# Patient Record
Sex: FEMALE | Race: WHITE | Employment: UNEMPLOYED | URBAN - METROPOLITAN AREA
[De-identification: names, ages, dates, MRNs, and addresses within clinical notes are randomized per-mention and may not be internally consistent; named-entity substitution may affect disease eponyms.]

---

## 2022-02-15 ENCOUNTER — APPOINTMENT (OUTPATIENT)
Dept: GENERAL RADIOLOGY | Age: 27
End: 2022-02-15

## 2022-02-15 ENCOUNTER — HOSPITAL ENCOUNTER (EMERGENCY)
Age: 27
Discharge: HOME | End: 2022-02-15

## 2022-02-15 VITALS
HEIGHT: 63 IN | BODY MASS INDEX: 29.23 KG/M2 | DIASTOLIC BLOOD PRESSURE: 65 MMHG | SYSTOLIC BLOOD PRESSURE: 123 MMHG | HEART RATE: 95 BPM | RESPIRATION RATE: 20 BRPM | TEMPERATURE: 97.5 F | WEIGHT: 165 LBS | OXYGEN SATURATION: 100 %

## 2022-02-15 DIAGNOSIS — U07.1 COVID: Primary | ICD-10-CM

## 2022-02-15 DIAGNOSIS — R52 BODY ACHES: ICD-10-CM

## 2022-02-15 DIAGNOSIS — R00.0 TACHYCARDIA: ICD-10-CM

## 2022-02-15 LAB
HCG(URINE) PREGNANCY TEST: NEGATIVE
SARS-COV-2, NAAT: DETECTED
STREP GRP A PCR: NEGATIVE

## 2022-02-15 PROCEDURE — 6370000000 HC RX 637 (ALT 250 FOR IP): Performed by: PHYSICIAN ASSISTANT

## 2022-02-15 PROCEDURE — 99284 EMERGENCY DEPT VISIT MOD MDM: CPT

## 2022-02-15 PROCEDURE — 81025 URINE PREGNANCY TEST: CPT

## 2022-02-15 PROCEDURE — 87880 STREP A ASSAY W/OPTIC: CPT

## 2022-02-15 PROCEDURE — 87635 SARS-COV-2 COVID-19 AMP PRB: CPT

## 2022-02-15 PROCEDURE — 71046 X-RAY EXAM CHEST 2 VIEWS: CPT

## 2022-02-15 RX ORDER — ACETAMINOPHEN 325 MG/1
650 TABLET ORAL ONCE
Status: COMPLETED | OUTPATIENT
Start: 2022-02-15 | End: 2022-02-15

## 2022-02-15 RX ORDER — 0.9 % SODIUM CHLORIDE 0.9 %
1000 INTRAVENOUS SOLUTION INTRAVENOUS ONCE
Status: DISCONTINUED | OUTPATIENT
Start: 2022-02-15 | End: 2022-02-15 | Stop reason: HOSPADM

## 2022-02-15 RX ORDER — ACETAMINOPHEN 500 MG
500 TABLET ORAL EVERY 4 HOURS PRN
Qty: 30 TABLET | Refills: 0 | Status: SHIPPED | OUTPATIENT
Start: 2022-02-15 | End: 2022-02-20

## 2022-02-15 RX ORDER — BENZONATATE 100 MG/1
100 CAPSULE ORAL 3 TIMES DAILY PRN
Qty: 21 CAPSULE | Refills: 0 | Status: SHIPPED | OUTPATIENT
Start: 2022-02-15 | End: 2022-02-22

## 2022-02-15 RX ADMIN — ACETAMINOPHEN 650 MG: 325 TABLET ORAL at 21:12

## 2022-02-15 ASSESSMENT — PAIN SCALES - GENERAL: PAINLEVEL_OUTOF10: 8

## 2022-02-16 NOTE — ED NOTES
RN completed walking pox on patient. Patients status standing 100% RA; 99 HR. While walking perimeter of ED patient % RA; 118 HR. Provider notified.       Gissell Ma, MANOJ  02/15/22 2129

## 2022-02-16 NOTE — ED NOTES
Patient signed AMA paperwork d/t refusal of all blood work to r/o any other health issues. Patient verbalized understanding of dc paperwork, meds, and follow-up instructions. Patient ambulated, gait steady. Patient advised to follow-up with PCP.        Leslie Gordon RN  02/15/22 [id number]

## 2022-02-16 NOTE — ED NOTES
Patient refused all blood work, EKG, fluids, and other treatment. Patient states, \"I just need a covid test for work, I want to refuse everything else. \"   Provider aware and spoke with patient regarding orders. Patient continues to refuse.       Les Cordova RN  02/15/22 1943

## 2022-02-16 NOTE — ED PROVIDER NOTES
[address]  Department of Emergency Medicine   ED  Encounter Note  Admit Date/RoomTime: 2/15/2022  6:28 PM  ED Room:     NAME: Regan Dasilva  : 1995  MRN: [id number]     Chief Complaint:  Generalized Body Aches (c/o sore throat, body aches and thinks she has covid ) and Concern For COVID-19    History of Present Illness       Regan Dasilva is a 27 y.o. old female who presents to the emergency department by private vehicle, for sore throat, body aches, and HA, which began 2 day(s) prior to arrival.  States that she believes that she has Covid. States that she had Covid in 2020. She is not vaccinated against Covid. She states that she has been at a facility but has no known exposure to Covid. States that she is here just to get a test to see if she has Covid. Since onset the symptoms have been remaining constant and mild in severity. The symptoms are associated with mild nonproductive cough, mild sore throat, body aches, and a headache as it relates to today's visit. She denies worst headache of her life, sudden onset, head trauma, vision changes, hearing changes, speech changes, neck pain, fever, weakness, numbness, tingling, or other focal neurologic deficit. There has been no abdominal pain, chest tightness, SOB, pain with inspiration, hemoptysis, conjunctivitis, productive cough, nausea, vomiting, diarrhea, dizziness, lightheadedness, dysuria, urinary frequency, earache, fever, fatigue, hoarseness, runny nose, neck stiffness, rash, swollen glands, wheezing, loss of taste or loss of smell. The patient has not received any COVID-19 vaccine. She denies recent travel, surgery, calf swelling, history of blood clots, personal history of cancer, or hormone replacement therapy. She denies any chance of pregnancy as she states that she is in prison. She states nothing aggravates the sxs and Tylenol alleviates the symptoms.  Pt notes that she is a smoker but denies asthma, COPD, or other significant PMH. ROS   Pertinent positives and negatives are stated within HPI, all other systems reviewed and are negative. Past Medical History:  has no past medical history on file. Surgical History:  has no past surgical history on file. Social History:  reports that she has never smoked. She does not have any smokeless tobacco history on file. She reports that she does not drink alcohol and does not use drugs. Family History: family history is not on file. Allergies: Patient has no known allergies. Physical Exam   Oxygen Saturation Interpretation: Normal on room air analysis. ED Triage Vitals   BP Temp Temp Source Pulse Resp SpO2 Height Weight   02/15/22 1821 02/15/22 1821 02/15/22 1821 02/15/22 1821 02/15/22 1821 02/15/22 1821 02/15/22 1825 02/15/22 1825   118/78 97.5 °F (36.4 °C) Infrared 119 16 98 % 5' 3\" (1.6 m) 165 lb (74.8 kg)       Vitals:    02/15/22 1821 02/15/22 1825 02/15/22 2119   BP: 118/78  123/65   Pulse: 119  95   Resp: 16  20   Temp: 97.5 °F (36.4 °C)     TempSrc: Infrared     SpO2: 98%  100%   Weight:  165 lb (74.8 kg)    Height:  5' 3\" (1.6 m)      · Constitutional:  Alert, development consistent with age. · Eyes: EOMI bilaterally; PERRLA; conjunctiva clear bilaterally; upper and lower eyelids without any erythema or swelling or drainage  · Ears:  External Ears: Bilateral normal.               TM's & External Canals: normal TM's and external ear canals bilaterally. No mastoid tenderness bilaterally  · Nose:   There is no discharge, swelling or lesions noted. · Sinuses: no Bilateral maxillary sinus tenderness. no Bilateral frontal sinus tenderness. · Mouth:  normal tongue and buccal mucosa. · Throat: no erythema or exudates noted. Teeth and gums normal.  Airway Patent. No uvular deviation; no tonsillar swelling, exudate, or hypertrophy; patient handling secretions well  · Neck:  Supple. No meningeal signs. concerned for Covid. She is not vaccinated against Covid. Mild cough but nonproductive. No chest pain or shortness of breath. States that she is having body aches everywhere including bilateral calves. No calf swelling. Most likely just due to her body aches. Tachycardic at 119 when she comes in. I spoke with Dr. Morales. Labs and imaging ordered due to her tachycardia and calf pain bilaterally. Plan was to give her fluids and rule out a PE/DVT with a dimer as she was tachycardic and has COVID and calf pain bilaterally. Patient refused everything besides the Covid and strep test.  She also allowed a chest x-ray. Covid positive. Pregnancy negative. Chest x-ray negative. Ambulated and remained around 98/99%. Vitals improved in the ER. She was given Tylenol for the body aches. Patient refusing any other labs or imaging. I did explain my reasoning behind these orders as she was having calf pain and was tachycardic. I wanted to get a D-dimer. I also wanted to give her some fluids and do further labs. Patient alert and oriented x 3 and able to make her own decisions. I told her that leaving without this work-up could result in permanent disability or even death. I told her that I wanted to rule out a PE or other acute process. She verbalizes understanding. She states that she still wants to go home. We will send her home with Tylenol and something for the cough. Also gave her instructions for a pulse ox. We will have her follow-up with her family doctor. Given a name to follow-up with. Vitals improved in the ER. All questions answered. Pt educated on reasons to return to the ER, including need to return for new or worsening symptoms. This patient has chosen to leave against medical advice. I have personally explained to them that choosing to do so may result in permanent bodily harm or death.   I discussed at length that without further evaluation and monitoring there may be unforeseen circumstances and deterioration resulting in permanent bodily harm or death as a result of their choice. They are alert, oriented, and competent at this time. They state that they are aware of the serious risks as explained, but they continue to wish to leave against medical advice. In light of their decision to leave against medical advice, follow-up has been arranged and they are aware of the importance of following up as instructed. They have been advised that they should return to the ED immediately if they change their mind at any time, or if their condition begins to change or worsen. Assessment     1. COVID    2. Tachycardia    3. Body aches      Plan   Patient signed-out Against Medical Advice (AMA). Patient condition is stable    New Medications     Discharge Medication List as of 2/15/2022  9:27 PM      START taking these medications    Details   acetaminophen (TYLENOL) 500 MG tablet Take 1 tablet by mouth every 4 hours as needed for Pain, Disp-30 tablet, R-0Print      benzonatate (TESSALON PERLES) 100 MG capsule Take 1 capsule by mouth 3 times daily as needed for Cough, Disp-21 capsule, R-0Print           Electronically signed by Eder Kraft PA-C   DD: 2/15/22  **This report was transcribed using voice recognition software. Every effort was made to ensure accuracy; however, inadvertent computerized transcription errors may be present.   END OF ED PROVIDER NOTE         Belen Miller PA-C  02/15/22 9537